# Patient Record
Sex: FEMALE | URBAN - METROPOLITAN AREA
[De-identification: names, ages, dates, MRNs, and addresses within clinical notes are randomized per-mention and may not be internally consistent; named-entity substitution may affect disease eponyms.]

---

## 2024-12-20 ENCOUNTER — TELEPHONE (OUTPATIENT)
Dept: SURGERY | Facility: CLINIC | Age: 34
End: 2024-12-20
Payer: MEDICAID

## 2024-12-20 NOTE — TELEPHONE ENCOUNTER
Patient has a NBPV + Class aidee 01/06/25 = Needs a RD appt - Called & LVM to call me back to [facility] appt

## 2024-12-26 ENCOUNTER — TELEPHONE (OUTPATIENT)
Dept: SURGERY | Facility: CLINIC | Age: 34
End: 2024-12-26
Payer: MEDICAID

## 2025-01-06 ENCOUNTER — APPOINTMENT (OUTPATIENT)
Dept: SURGERY | Facility: CLINIC | Age: 35
End: 2025-01-06
Payer: MEDICAID

## 2025-01-06 VITALS — BODY MASS INDEX: 46.61 KG/M2 | HEIGHT: 66 IN | WEIGHT: 290 LBS

## 2025-01-06 DIAGNOSIS — K21.9 GASTROESOPHAGEAL REFLUX DISEASE WITHOUT ESOPHAGITIS: ICD-10-CM

## 2025-01-06 DIAGNOSIS — Z98.84 BARIATRIC SURGERY STATUS: ICD-10-CM

## 2025-01-06 DIAGNOSIS — E66.01 MORBID OBESITY DUE TO EXCESS CALORIES (MULTI): Primary | ICD-10-CM

## 2025-01-06 DIAGNOSIS — E66.01 MORBID OBESITY (MULTI): ICD-10-CM

## 2025-01-06 PROCEDURE — 99205 OFFICE O/P NEW HI 60 MIN: CPT | Performed by: SURGERY

## 2025-01-06 PROCEDURE — 3008F BODY MASS INDEX DOCD: CPT | Performed by: SURGERY

## 2025-01-06 NOTE — PATIENT INSTRUCTIONS
PLAN:  The plan of treatment for Ronan Rosa is to continue with the consultations and tests ordered today in hopes of qualifying for pre-operative clearance for bariatric surgery. This includes:  Read labels and know what is in your food.     Limit rice,  bread and pasta to once/week  Consult Nutrition for education and MSWL  Consult Psychology  Consult Physical Therapy for limited mobility  Consult cardiology  Consult pulmonology  Labs/CXR/EKG ordered  EGD  PCP for medical optimization  Consult sleep medicine - concern for BRAD    The following are some lifestyle changes you should begin to prepare you for your sleeve surgery.   Eliminate soda and other carbonated beverages from your diet. Carbonation will not be well tolerated after surgery. Try Propel, Vitamin Water Zero, Sobe Lifewater, Crystal Light or water.    Increase fluid consumption to 64 oz daily. Do not drink within 30 minutes of eating as this will liquefy your food and make you hungry more quickly.    Exercise for 30-60 minutes daily. Brisk walking, bike riding and swimming are all examples of healthy exercise. If you are unable to exercise we recommend seated exercise.    Do not skip meals.    Take a multivitamin daily.    Lose weight. In preparation for your surgery it is important that you begin making healthier food choices now. Our dietitian will meet with you to help you select foods lower in calories and higher in nutrition. We would like you to lose at least 10  lbs prior to surgery.     Increase your protein intake to 60 grams per day.    Alcohol is empty calories. Please eliminate while preparing for surgery.    Plan your meals.      General Instruction: 1) Use the information we gave you today to work through your insurance requirements and medical clearances.   2) These documents need to get faxed to the program navigators so they can submit them for approval from your insurance company.   3) Obtain labs today at a  facility. We will  call you with any abnormalities and corrections you need to make.   4) Continue to work with your primary care doctor and other specialist so your other health problems are well controlled prior to your surgery.   5) Adopt the recommendations of the program dietician so you develop healthy eating patterns.   6) Work with the sleep team to get your sleep apnea treated to prevent other health problems .   7) Consider attending a support group to learn from other who have been through the process.   8) Come to the MSWL sessions.

## 2025-01-06 NOTE — PROGRESS NOTES
Subjective   Date: 1/6/2025 Time: 12:13 PM  Name: Ronan Rosa  MRN: [id number]  This is a 34 y.o. female with morbid obesity (Body mass index is 46.81 kg/m².) who presents to clinic for consideration of bariatric surgery. she has attempted and failed multiple diet and exercise regimens for weight loss. Initial Onset of obesity was at 18 years old.  Their goal for surgery is to  be healthier  and lose weight. The patient has tried multiple diets to lose weight including  intermittent fasting . The patient was most successful with the  intermittent fasting . The most pounds lost on this diet were 15 lbs. The patient considers their dietary weakness to be carbs, fast food, portion size and sweets. The patient reports a  highest weight ever of 292 pounds and lowest weight ever of 230 pounds Distribution of Obesity: is general. Current diet: none Compliance: Poor Adherence Diet Problems: The patient exercises 3 times /week  60 Minutes/Day Types of Exercise : strength training and walking  She feels nothing else  is working. Feels portion size is an issue and exercising and eating right consistently.   Comorbidities: anxiety, back paintakes NSAIDS, depressed moodtakes medications, esophageal reflux, joint paintakes NSAIDs, and knee paintakes NSAIDs    Uses ibuprofen every othermonth.  Reflux is new and trying to see what iscausing it    Procedure preferred - Band, Balloon,Sleeve possible meds      NO PPI    How bad is the heartburn? 3 = Symptoms bothersome every day  Heartburn when lying down? 3 = Symptoms bothersome every day  Heartburn when standing up? 3 = Symptoms bothersome every day  Heartburn after meals? 3 = Symptoms bothersome every day  Does heartburn change your diet? 2 = Symptoms noticeable and bothersome but not every day  Does heartburn wake you from sleep? 0 = No symptoms  Do you have difficulty swallowing? 0 = No symptoms  Do you have pain with swallowing? 0 = No symptoms  If you take medication, does  this affect your daily life? 0 = No symptoms  How bad is the regurgitation? 3 = Symptoms bothersome every day  Regurgitation when lying down? 0 = No symptoms  Regurgitation when standing up? 3 = Symptoms bothersome every day  Regurgitation after meals? 3 = Symptoms bothersome every day  Does regurgitation change your diet? 1 = Symptoms noticeable but not bothersome  Does regurgitation wake you from sleep? 0 = No symptoms  How satisfied are you with your present condition? Dissatisfied    PMH: No past medical history on file.     PSH:   Past Surgical History:   Procedure Laterality Date     SECTION, LOW TRANSVERSE            FAMILY HISTORY:  Family History   Problem Relation Name Age of Onset    Hypertension Mother      Diabetes Father          SOCIAL HISTORY:  Social History     Tobacco Use    Smoking status: Former     Current packs/day: 1.00     Types: Cigarettes     Did1 cigarette/ day for a week in August but none recent. No vaping or hookahs  MEDICATIONS:  Prior to Admission Medications:  Medication Documentation Review Audit       Reviewed by Saleem Henderson MD (Physician) on 25 at 1121      Medication Order Taking? Sig Documenting Provider Last Dose Status            No Medications to Display                                    ALLERGIES:  No Known Allergies    REVIEW OF SYSTEMS:  GENERAL: Negative for malaise, significant weight loss and fever  HEAD: Negative for headache, swelling.  NECK: Negative for lumps, goiter, pain and significant neck swelling  RESPIRATORY: Negative for cough, wheezing or shortness of breath.  CARDIOVASCULAR: Negative for chest pain, leg swelling or palpitations.  GI: Negative for abdominal discomfort, blood in stools or black stools or change in bowel habits  : No history of dysuria, frequency or incontinence  MUSCULOSKELETAL: Negative for joint pain or swelling, back pain or muscle pain.  SKIN: Negative for lesions, rash, and itching.  PSYCH: Negative for sleep  "disturbance, mood disorder and recent psychosocial stressors.  ENDOCRINE: Negative for cold or heat intolerance, polyuria, polydipsia and goiter.    Objective   PHYSICAL EXAM:  Visit Vitals  Ht 1.676 m (5' 6\")   Wt 132 kg (290 lb)   BMI 46.81 kg/m²   Smoking Status Former   BSA 2.48 m²     General appearance: obese, NAD  Neuro: AOx3  Head: EOMI; no swelling or lesions of scalp or face  ENT:  no lumps or lymphadenopathy, thyroid normal to palpation; oropharynx clear, no swelling or erythema  Skin: warm, no erythema or rashes  Lungs: clear to percussion and auscultation  Heart: regular rhythm and S1, S2 normal  Abdomen: soft, non-tender, no masses, no organomegaly  Extremities: Normal exam of the extremities. No swelling or pain.  Psych: no hurried speech, no flight of ideas, normal affect    IMPRESSION:  Ronan Rosa is a 34 y.o. female with a bmi of Body mass index is 46.81 kg/m². with the following diagnoses and co-morbidities: knee pain and family histroy of htn and dm.  Notes weight just keeps going up.  She is great candidate for weight loss surgery.   She is low risk for complication.     We discussed the sleeve  at [facility] and all questions were answered. risks and benefits were discussed  The patient understands the risks and benefits of the procedure and how the procedure is performed. The patient understands the risks include but are not limited to bleeding, infection, DVT, PE, pneumonia, myocardial infarction, leak along the staple lines, and weight regain. We discussed lifestyle changes necessary to be successful.       This patient does meet the criteria for a surgical weight loss procedure according to NIH guidelines.  The risks of sleeve gastrectomy, Abdi-en-Y gastric bypass, and duodenal switch surgery including bleeding, leak, wound infection, dehydration, ulcers, internal hernia, DVT/PE, prolonged nausea/vomiting, incomplete resolution of associated medical conditions, reflux, weight regain, " vitamin/mineral deficiencies, and death have been explained to the patient and Ronan Rosa has expressed understanding and acceptance of them.     The increased risk of substance and alcohol abuse following bariatric surgery was discussed with the patient, along with the negative consequences of substance/alcohol use after surgery including addiction, worsening of mental health disorders, and injury to the stomach. The risk of smoking and vaping (tobacco or any other substance) after bariatric surgery was explained to the patient. This includes risk of anastamotic ulcers, gastritis, bleeding, perforation, stricture, and PO intolerance.  The patient expressed understanding and acceptance of these risks.    The benefits of the above surgeries including weight loss, improvement/resolution of associated medical and mental health conditions, improved mobility, and decreased mortality have been explained the the patient and Ronan Rosa has expressed understanding and acceptance of them.    Assessment/Plan   PLAN:  The plan of treatment for Ronan Rosa is to continue with the consultations and tests ordered today in hopes of qualifying for pre-operative clearance for bariatric surgery. This includes:  Read labels and know what is in your food.     Limit rice,  bread and pasta to once/week  Consult Nutrition for education and MSWL  Consult Psychology  Consult Physical Therapy for limited mobility  Consult cardiology  Consult pulmonology  Labs/CXR/EKG ordered  EGD  PCP for medical optimization  Consult sleep medicine - concern for BRAD    The following are some lifestyle changes you should begin to prepare you for your sleeve surgery.   Eliminate soda and other carbonated beverages from your diet. Carbonation will not be well tolerated after surgery. Try Propel, Vitamin Water Zero, Sobe Lifewater, Crystal Light or water.    Increase fluid consumption to 64 oz daily. Do not drink within 30 minutes of eating as this  will liquefy your food and make you hungry more quickly.    Exercise for 30-60 minutes daily. Brisk walking, bike riding and swimming are all examples of healthy exercise. If you are unable to exercise we recommend seated exercise.    Do not skip meals.    Take a multivitamin daily.    Lose weight. In preparation for your surgery it is important that you begin making healthier food choices now. Our dietitian will meet with you to help you select foods lower in calories and higher in nutrition. We would like you to lose at least 10  lbs prior to surgery.     Increase your protein intake to 60 grams per day.    Alcohol is empty calories. Please eliminate while preparing for surgery.    Plan your meals.      General Instruction: 1) Use the information we gave you today to work through your insurance requirements and medical clearances.   2) These documents need to get faxed to the program navigators so they can submit them for approval from your insurance company.   3) Obtain labs today at a  facility. We will call you with any abnormalities and corrections you need to make.   4) Continue to work with your primary care doctor and other specialist so your other health problems are well controlled prior to your surgery.   5) Adopt the recommendations of the program dietician so you develop healthy eating patterns.   6) Work with the sleep team to get your sleep apnea treated to prevent other health problems .   7) Consider attending a support group to learn from other who have been through the process.   8) Come to the MSWL sessions.   45 minutes were spent with patient including history, physical exam, and education.

## 2025-01-07 ENCOUNTER — APPOINTMENT (OUTPATIENT)
Dept: SURGERY | Facility: CLINIC | Age: 35
End: 2025-01-07
Payer: MEDICAID

## 2025-01-07 VITALS — HEIGHT: 66 IN | WEIGHT: 290 LBS | BODY MASS INDEX: 46.61 KG/M2

## 2025-01-07 NOTE — PROGRESS NOTES
"Initial Bariatric Nutrition Assessment    Surgeon: Marta  Patient is considering: sleeve gastrectomy    ASSESSMENT:  Current weight:   Vitals:    01/07/25 1412   Weight: 132 kg (290 lb)    Ht:  1.676 m (5' 6\")  BMI: Body mass index is 46.81 kg/m².        Initial start weight in pounds: 290.0    Pre-Op Excess Body Weight (EBW) in pounds:  135.0  Target Post-Op weight goal in pounds: 202.2 - 222.5       This is a 34 y.o. female with morbid obesity (Body mass index is 46.81 kg/m².) who presents to clinic for consideration of bariatric surgery. The patient has attempted and failed multiple diet and exercise regimens for weight loss.   Goal for surgery is to lower weight for overall health.      Food allergies/intolerances: no   Chewing/Swallowing/Dentition: no  Nausea / Vomiting / Hx Gastroparesis: possible acid reflux  Diarrhea/ Constipation: no  Smoking/Tobacco use: no  Vitamins/Minerals supplements: MVI  Hours of sleep/night: 6-7  Exercise: no  Previous Weight loss Attempts: Intermittent fasting, Keto, Melania's, otc. Diet pills, RX meds    Medications:   No current outpatient medications on file.    24 HOUR RECALL/DIET HISTORY:  Breakfast:  skipped  Snack: no  Lunch: baked chicken with skin, white rice and vegetables  Snack: alba. chip cookies  Dinner: baked pork chop and greens  Snack: no  Beverages: water, pop, tea with honey, juice, lemonade, Body Monona  Alcohol: occasionally    Person responsible for cooking & shopping? self  How often do you eat sweet snacks? 5x/wk  How often do you eat savory snacks?  2x/wk  How often do you eat out? 5x/wk    Do you feel overly stuffed? no  Binge Eating? no   Night Eating? sometimes  Emotional Eating? some stress and boredom     READINESS TO LEARN:  Motivation to learn: Interested        Understanding of instruction: Good  Anticipated Compliance: Good  Family Support: friend     Educational Materials Provided:    Pre-op Diet and meal plan                                       "    Nutrition Guidelines for Gastric Bypass and Sleeve Gastrectomy   Schedules for MSWL class and support group   Goals sheet    Nutrition assessment completed today.  Pt will be scheduled for video education class to discuss the 2 week pre op diet, post op protein and fluid goals, vitamin and mineral supplementation, exercise goals, and post op diet progression closer to the time of surgery.    Instructed pt on a 1400 calorie meal plan and encouraged patient  to measure and record intake daily.  Advised to eat 3 meals and 1-2 high protein or produce based snacks daily.   Reviewed postop behaviors to start practicing.   Set a goal to start doing regular exercise.    Patient was receptive to nutritional recommendations, asked numerous questions, and verbalized understanding of the weight loss surgery diet.  Patient expressed understanding about the importance of strict dietary compliance post-surgery to avoid nutritional deficiencies and achieve optimal weight loss and verbalized intent to follow dietary recommendations.    Malnutrition Screening: n/a  Significant unintentional weight loss? n/a   Eating less than 75% of usual intake for more than 2 weeks? n/a      Nutrition Diagnosis:   Overweight/obesity related to excess energy intake as evidenced by BMI >= 40 kg/m^2.  Food- and nutrition-related knowledge deficit related to lack of prior exposure to surgical weight loss information as evidenced by pt new to surgical program.    Nutrition Interventions:   Modify type and amount of food and nutrients within meals and snacks.  Comprehensive Nutrition Education    Recommendations:  1. Begin following your meal plan.  Measure and record intake daily.   2. Structure meal patterns, eating three meals and 1-2 snacks per day.  3. Aim for 15-30 grams protein per meal.  Have 1-2 high protein snacks that are 10-20 g protein each.  You can try a tuna or chicken packet, Greek yogurt, 2 string cheeses, Protein bars like Quest,  Pure Protein, Premier, or Built Bars. you can also try protein chips form Quest or Atkins.    4. Drink 64oz of calorie-free, caffeine-free, and non-carbonated beverages.   5. Practice no drinking 30 minutes before meals, nothing with meals and wait 30 minutes after meals to drink again. Make meals last 30 minutes-chew thoroughly.   6. Limit or omit eating out/sweets/savory snacks to 1-2 times per week.  7. Begin daily multivitamin that is not a gummy multivitamin.  8. Increase physical activity by 10-15 minutes as tolerated to an end goal of 60 minutes 5 x per week. Consistency is the key.  Pre-op Goal weight: lose 5% of body weight    Nutrition Monitoring and Evaluation: 1-2 pound weight loss per week  Criteria: weight check  Need for Follow-up: in 1 month    Patient does meet [employer] guidelines for weight loss surgery, however needs to demonstrate consistent effort in making dietary changes before giving clearance. It is anticipated that the patient will need at least 1 nutritional follow-up visits prior to clearance for surgery.

## 2025-01-13 DIAGNOSIS — Z01.818 PRE-OPERATIVE CLEARANCE: ICD-10-CM

## 2025-01-13 DIAGNOSIS — K21.9 GASTROESOPHAGEAL REFLUX DISEASE WITHOUT ESOPHAGITIS: ICD-10-CM

## 2025-01-13 DIAGNOSIS — Z98.84 BARIATRIC SURGERY STATUS: ICD-10-CM

## 2025-01-14 ENCOUNTER — HOSPITAL ENCOUNTER (OUTPATIENT)
Dept: RADIOLOGY | Facility: CLINIC | Age: 35
Discharge: HOME | End: 2025-01-14
Payer: MEDICAID

## 2025-01-14 ENCOUNTER — LAB (OUTPATIENT)
Dept: LAB | Facility: LAB | Age: 35
End: 2025-01-14
Payer: MEDICAID

## 2025-01-14 DIAGNOSIS — K21.9 GASTROESOPHAGEAL REFLUX DISEASE WITHOUT ESOPHAGITIS: ICD-10-CM

## 2025-01-14 DIAGNOSIS — Z98.84 BARIATRIC SURGERY STATUS: ICD-10-CM

## 2025-01-14 DIAGNOSIS — Z01.818 PRE-OPERATIVE CLEARANCE: ICD-10-CM

## 2025-01-14 LAB
25(OH)D3 SERPL-MCNC: 27 NG/ML (ref 30–100)
ALBUMIN SERPL BCP-MCNC: 4 G/DL (ref 3.4–5)
ALP SERPL-CCNC: 54 U/L (ref 33–110)
ALT SERPL W P-5'-P-CCNC: 18 U/L (ref 7–45)
AMPHETAMINES UR QL SCN: NORMAL
ANION GAP SERPL CALC-SCNC: 12 MMOL/L (ref 10–20)
APTT PPP: 29 SECONDS (ref 27–38)
AST SERPL W P-5'-P-CCNC: 14 U/L (ref 9–39)
BARBITURATES UR QL SCN: NORMAL
BASOPHILS # BLD AUTO: 0.03 X10*3/UL (ref 0–0.1)
BASOPHILS NFR BLD AUTO: 0.5 %
BENZODIAZ UR QL SCN: NORMAL
BILIRUB SERPL-MCNC: 0.4 MG/DL (ref 0–1.2)
BUN SERPL-MCNC: 13 MG/DL (ref 6–23)
BZE UR QL SCN: NORMAL
CALCIUM SERPL-MCNC: 8.9 MG/DL (ref 8.6–10.6)
CANNABINOIDS UR QL SCN: NORMAL
CHLORIDE SERPL-SCNC: 106 MMOL/L (ref 98–107)
CHOLEST SERPL-MCNC: 98 MG/DL (ref 0–199)
CHOLESTEROL/HDL RATIO: 2.6
CO2 SERPL-SCNC: 24 MMOL/L (ref 21–32)
CREAT SERPL-MCNC: 0.72 MG/DL (ref 0.5–1.05)
EGFRCR SERPLBLD CKD-EPI 2021: >90 ML/MIN/1.73M*2
EOSINOPHIL # BLD AUTO: 0.08 X10*3/UL (ref 0–0.7)
EOSINOPHIL NFR BLD AUTO: 1.3 %
ERYTHROCYTE [DISTWIDTH] IN BLOOD BY AUTOMATED COUNT: 16.3 % (ref 11.5–14.5)
EST. AVERAGE GLUCOSE BLD GHB EST-MCNC: 105 MG/DL
FENTANYL+NORFENTANYL UR QL SCN: NORMAL
FERRITIN SERPL-MCNC: 23 NG/ML (ref 8–150)
FOLATE SERPL-MCNC: 20.3 NG/ML
GLUCOSE SERPL-MCNC: 85 MG/DL (ref 74–99)
HBA1C MFR BLD: 5.3 %
HCT VFR BLD AUTO: 34.2 % (ref 36–46)
HDLC SERPL-MCNC: 38 MG/DL
HGB BLD-MCNC: 10.7 G/DL (ref 12–16)
IMM GRANULOCYTES # BLD AUTO: 0.01 X10*3/UL (ref 0–0.7)
IMM GRANULOCYTES NFR BLD AUTO: 0.2 % (ref 0–0.9)
INR PPP: 1.2 (ref 0.9–1.1)
IRON SATN MFR SERPL: 14 % (ref 25–45)
IRON SERPL-MCNC: 60 UG/DL (ref 35–150)
LDLC SERPL CALC-MCNC: 51 MG/DL
LYMPHOCYTES # BLD AUTO: 2.44 X10*3/UL (ref 1.2–4.8)
LYMPHOCYTES NFR BLD AUTO: 41 %
MCH RBC QN AUTO: 23.7 PG (ref 26–34)
MCHC RBC AUTO-ENTMCNC: 31.3 G/DL (ref 32–36)
MCV RBC AUTO: 76 FL (ref 80–100)
METHADONE UR QL SCN: NORMAL
MONOCYTES # BLD AUTO: 0.47 X10*3/UL (ref 0.1–1)
MONOCYTES NFR BLD AUTO: 7.9 %
NEUTROPHILS # BLD AUTO: 2.92 X10*3/UL (ref 1.2–7.7)
NEUTROPHILS NFR BLD AUTO: 49.1 %
NON HDL CHOLESTEROL: 60 MG/DL (ref 0–149)
NRBC BLD-RTO: 0 /100 WBCS (ref 0–0)
OPIATES UR QL SCN: NORMAL
OXYCODONE+OXYMORPHONE UR QL SCN: NORMAL
PCP UR QL SCN: NORMAL
PLATELET # BLD AUTO: 330 X10*3/UL (ref 150–450)
POTASSIUM SERPL-SCNC: 4.2 MMOL/L (ref 3.5–5.3)
PROT SERPL-MCNC: 7.3 G/DL (ref 6.4–8.2)
PROTHROMBIN TIME: 13 SECONDS (ref 9.8–12.8)
PTH-INTACT SERPL-MCNC: 47.5 PG/ML (ref 18.5–88)
RBC # BLD AUTO: 4.52 X10*6/UL (ref 4–5.2)
SODIUM SERPL-SCNC: 138 MMOL/L (ref 136–145)
T4 FREE SERPL-MCNC: 1.21 NG/DL (ref 0.78–1.48)
TIBC SERPL-MCNC: 418 UG/DL (ref 240–445)
TRIGL SERPL-MCNC: 43 MG/DL (ref 0–149)
TSH SERPL-ACNC: 0.91 MIU/L (ref 0.44–3.98)
UIBC SERPL-MCNC: 358 UG/DL (ref 110–370)
VIT B12 SERPL-MCNC: 468 PG/ML (ref 211–911)
VLDL: 9 MG/DL (ref 0–40)
WBC # BLD AUTO: 6 X10*3/UL (ref 4.4–11.3)

## 2025-01-14 PROCEDURE — 71046 X-RAY EXAM CHEST 2 VIEWS: CPT

## 2025-01-14 PROCEDURE — 85610 PROTHROMBIN TIME: CPT

## 2025-01-14 PROCEDURE — 82746 ASSAY OF FOLIC ACID SERUM: CPT

## 2025-01-14 PROCEDURE — 84439 ASSAY OF FREE THYROXINE: CPT

## 2025-01-14 PROCEDURE — 80323 ALKALOIDS NOS: CPT

## 2025-01-14 PROCEDURE — 80307 DRUG TEST PRSMV CHEM ANLYZR: CPT

## 2025-01-14 PROCEDURE — 82728 ASSAY OF FERRITIN: CPT

## 2025-01-14 PROCEDURE — 82306 VITAMIN D 25 HYDROXY: CPT

## 2025-01-14 PROCEDURE — 84425 ASSAY OF VITAMIN B-1: CPT

## 2025-01-14 PROCEDURE — 82607 VITAMIN B-12: CPT

## 2025-01-14 PROCEDURE — 82525 ASSAY OF COPPER: CPT

## 2025-01-14 PROCEDURE — 80053 COMPREHEN METABOLIC PANEL: CPT

## 2025-01-14 PROCEDURE — 36415 COLL VENOUS BLD VENIPUNCTURE: CPT

## 2025-01-14 PROCEDURE — 84630 ASSAY OF ZINC: CPT

## 2025-01-14 PROCEDURE — 85730 THROMBOPLASTIN TIME PARTIAL: CPT

## 2025-01-14 PROCEDURE — 80061 LIPID PANEL: CPT

## 2025-01-14 PROCEDURE — 83970 ASSAY OF PARATHORMONE: CPT

## 2025-01-14 PROCEDURE — 84443 ASSAY THYROID STIM HORMONE: CPT

## 2025-01-14 PROCEDURE — 83540 ASSAY OF IRON: CPT

## 2025-01-14 PROCEDURE — 83036 HEMOGLOBIN GLYCOSYLATED A1C: CPT

## 2025-01-14 PROCEDURE — 83550 IRON BINDING TEST: CPT

## 2025-01-14 PROCEDURE — 85025 COMPLETE CBC W/AUTO DIFF WBC: CPT

## 2025-01-15 ENCOUNTER — ANCILLARY PROCEDURE (OUTPATIENT)
Dept: CARDIOLOGY | Facility: CLINIC | Age: 35
End: 2025-01-15
Payer: MEDICAID

## 2025-01-15 ENCOUNTER — TELEPHONE (OUTPATIENT)
Dept: OBSTETRICS AND GYNECOLOGY | Facility: CLINIC | Age: 35
End: 2025-01-15

## 2025-01-15 DIAGNOSIS — K21.9 GASTROESOPHAGEAL REFLUX DISEASE WITHOUT ESOPHAGITIS: ICD-10-CM

## 2025-01-15 DIAGNOSIS — Z01.818 PRE-OPERATIVE CLEARANCE: ICD-10-CM

## 2025-01-15 DIAGNOSIS — Z98.84 BARIATRIC SURGERY STATUS: ICD-10-CM

## 2025-01-15 LAB
ATRIAL RATE: 64 BPM
P AXIS: 36 DEGREES
P OFFSET: 181 MS
P ONSET: 135 MS
PR INTERVAL: 166 MS
Q ONSET: 218 MS
QRS COUNT: 10 BEATS
QRS DURATION: 80 MS
QT INTERVAL: 384 MS
QTC CALCULATION(BAZETT): 396 MS
QTC FREDERICIA: 392 MS
R AXIS: 71 DEGREES
T AXIS: 38 DEGREES
T OFFSET: 410 MS
VENTRICULAR RATE: 64 BPM

## 2025-01-15 PROCEDURE — 93010 ELECTROCARDIOGRAM REPORT: CPT | Performed by: INTERNAL MEDICINE

## 2025-01-15 PROCEDURE — 93005 ELECTROCARDIOGRAM TRACING: CPT

## 2025-01-16 LAB
COPPER SERPL-MCNC: 151.5 UG/DL (ref 80–155)
COTININE SERPL-MCNC: <5 NG/ML
NICOTINE SERPL-MCNC: <5 NG/ML
ZINC SERPL-MCNC: 77.8 UG/DL (ref 60–120)

## 2025-01-18 LAB — VIT B1 PYROPHOSHATE BLD-SCNC: 81 NMOL/L (ref 70–180)

## 2025-01-22 ENCOUNTER — APPOINTMENT (OUTPATIENT)
Dept: CARDIOLOGY | Facility: CLINIC | Age: 35
End: 2025-01-22
Payer: MEDICAID

## 2025-01-27 ENCOUNTER — APPOINTMENT (OUTPATIENT)
Dept: SLEEP MEDICINE | Facility: CLINIC | Age: 35
End: 2025-01-27
Payer: MEDICAID

## 2025-02-05 ENCOUNTER — APPOINTMENT (OUTPATIENT)
Dept: SURGERY | Facility: CLINIC | Age: 35
End: 2025-02-05
Payer: MEDICAID

## 2025-02-05 VITALS — WEIGHT: 284 LBS | BODY MASS INDEX: 45.64 KG/M2 | HEIGHT: 66 IN

## 2025-02-05 NOTE — PROGRESS NOTES
"PREOPERATIVE, MULTIDISCIPLINARY, MEDICALLY SUPERVISED, REDUCED CALORIE DIET, BEHAVIOR MODIFICATION AND EXERCISE PROGRAM    S:  The patient is working on modifying her diet.  24 hour food recall:  B:  Premier protein shake L: 2 chicken tacos with cheese, lettuce, salsa and Italian dressing; D: grilled chicken salad with croutons., cheese and Caesar dressing; s; cinnamon pretzel nuggets.    O:    Vitals:    02/05/25 0910   Weight: 129 kg (284 lb)    Ht:   1.676 m (5' 6\")     BMI: Body mass index is 45.84 kg/m².    Goal: 5% body weight loss over the course of program    Dietary recommendation:   1. Eliminate high calorie, carbonated, and caffeinated beverages  Try Propel, Minute Maid Zero or diet cran juice with 5 calories.  Try sf lemonade such as Crystal Light.  2. Practice the 30-30-30 rule by drinking between meals.  3. Structure your meal plan - have 3 meals and 1 snack daily.  Use low calorie candiments with 60 calories or less per serving.  4. Have balanced meals that always contain a good source of protein.  5. Increase intake of non-starchy vegetables.  Have 5 servings fruits and vegetables daily.   6. Take a multivitamin daily.  7. Increase physical activity by 10-15 minutes to an end goal of 60 minutes 5 x per week.    Group Topic: Portion Control   Behavioral recommendation: Pt is encouraged to identify and use the appropriate serving sizes from each food group.    A/P: Pt appears to have a good understanding of how to incorporate appropriate portion sizes into their daily meal pattern.  Pt has a goal to begin weighing and measuring portions until able to visualize the appropriate portion size.    Exercise: Walking 3x/wk for 30 min.    Scheduled for NBP zoom next week and will follow-up next month.    Shahnaz Fregoso, RD, LD  "

## 2025-02-24 ENCOUNTER — APPOINTMENT (OUTPATIENT)
Facility: CLINIC | Age: 35
End: 2025-02-24
Payer: MEDICAID

## 2025-02-24 VITALS
HEIGHT: 66 IN | RESPIRATION RATE: 18 BRPM | TEMPERATURE: 98.1 F | BODY MASS INDEX: 46.51 KG/M2 | DIASTOLIC BLOOD PRESSURE: 77 MMHG | SYSTOLIC BLOOD PRESSURE: 109 MMHG | HEART RATE: 74 BPM | OXYGEN SATURATION: 95 % | WEIGHT: 289.4 LBS

## 2025-02-24 DIAGNOSIS — K21.9 GASTROESOPHAGEAL REFLUX DISEASE WITHOUT ESOPHAGITIS: ICD-10-CM

## 2025-02-24 DIAGNOSIS — G47.30 SLEEP DISORDER BREATHING: Primary | ICD-10-CM

## 2025-02-24 DIAGNOSIS — Z01.818 PRE-OPERATIVE CLEARANCE: ICD-10-CM

## 2025-02-24 DIAGNOSIS — Z98.84 BARIATRIC SURGERY STATUS: ICD-10-CM

## 2025-02-24 PROCEDURE — 3008F BODY MASS INDEX DOCD: CPT | Performed by: GENERAL PRACTICE

## 2025-02-24 PROCEDURE — 99244 OFF/OP CNSLTJ NEW/EST MOD 40: CPT | Performed by: GENERAL PRACTICE

## 2025-02-24 PROCEDURE — 1036F TOBACCO NON-USER: CPT | Performed by: GENERAL PRACTICE

## 2025-02-24 ASSESSMENT — PATIENT HEALTH QUESTIONNAIRE - PHQ9
1. LITTLE INTEREST OR PLEASURE IN DOING THINGS: NOT AT ALL
SUM OF ALL RESPONSES TO PHQ9 QUESTIONS 1 AND 2: 0
2. FEELING DOWN, DEPRESSED OR HOPELESS: NOT AT ALL

## 2025-02-24 ASSESSMENT — COLUMBIA-SUICIDE SEVERITY RATING SCALE - C-SSRS
6. HAVE YOU EVER DONE ANYTHING, STARTED TO DO ANYTHING, OR PREPARED TO DO ANYTHING TO END YOUR LIFE?: NO
2. HAVE YOU ACTUALLY HAD ANY THOUGHTS OF KILLING YOURSELF?: NO
1. IN THE PAST MONTH, HAVE YOU WISHED YOU WERE DEAD OR WISHED YOU COULD GO TO SLEEP AND NOT WAKE UP?: NO

## 2025-02-24 ASSESSMENT — SLEEP AND FATIGUE QUESTIONNAIRES: HOW LIKELY ARE YOU TO NOD OFF OR FALL ASLEEP WHILE SITTING AND READING: WOULD NEVER DOZE

## 2025-02-24 NOTE — PROGRESS NOTES
Patient: Tasha Solano    [id number]  : 1990 -- AGE 34 y.o.    Provider: Carlos Schmidt DO     Location [facility]   Service Date: 2025              [facility]  New Visit Note        HISTORY OF PRESENT ILLNESS     The patient's referring provider is: Saleem Henderson MD    HISTORY OF PRESENT ILLNESS   My Matilde Solano is a 34 y.o. female who presents to a [facility] for a sleep medicine evaluation with concerns of Referral, Snoring, and Dry Mouth.     The patient  has a past medical history of GERD (gastroesophageal reflux disease) and Kidney stone..    PAST SLEEP HISTORY    Pmhx includes GERD?, papilledema?. Obesity.     Patient states that she would like to get bariatric surgery done. She presents for an evaluation of suspected sleep apnea.       Sleep schedule  on weekdays / work days:  Usual Bedtime: 10-11pm  Sleep latency: about 1hr, emails/ social media.   Wake time : 7am  Total sleep time average/day: 6-7 hours/day  Awakenings: 2x per night, nocturia, short.   Naps: 2-3x per week, 1-3pm, 1hr, refreshing sometimes.      Sleep schedule  on weekends/non work days :  Usual Bedtime:   11pm-1am  Wake time : 8am    Sleep aids: takes mg, she feels that it gives her a deeper sleep.   Stimulants: no    Occupation: , regular hrs.     Preferred sleeping position: SLEEP POSITION: prone    Sleep-related ROS:    Snoring:  y  Witnessed apneas: n       Gasping/ choking: n     Am Dry mouth: y            Nasal congestion:  n       am headaches: n    Sleep is described as unrefreshing sometimes.     Daytime sleepiness: y  Fatigue or decreased energy: y  Difficulty remembering things in daytime: sometimes  Difficulty staying focused in daytime: n  Irritable during the day: sometimes    Drowsy driving: n  Hx of car accident: n  Near-miss Car accident: n      RLS screen:  RLSSCREEN: - Sensations:  "Patient does not have unusual sensations in their extremities that cause an urge to move them     Sleep-related behaviors: DENIES      ESS: 4     REVIEW OF SYSTEMS     REVIEW OF SYSTEMS  Review of Systems   All other systems reviewed and are negative.        ALLERGIES AND MEDICATIONS     ALLERGIES  No Known Allergies    MEDICATIONS  No current outpatient medications on file.     No current facility-administered medications for this visit.         PAST HISTORY     PAST MEDICAL HISTORY  She  has a past medical history of GERD (gastroesophageal reflux disease) and Kidney stone.      PAST SURGICAL HISTORY:  Past Surgical History:   Procedure Laterality Date     SECTION, LOW TRANSVERSE      KIDNEY STONE SURGERY         FAMILY HISTORY  Family History   Problem Relation Name Age of Onset    Hypertension Mother      Diabetes Father       DOES/DOES NOT EC: does not have a family history of sleep disorder.      SOCIAL HISTORY  She  reports that she has quit smoking. Her smoking use included cigarettes. She has never used smokeless tobacco. She reports current alcohol use. She reports that she does not use drugs.     Caffeine consumption: no  Smoking: n  Marijuana: n  Other drugs: n      PHYSICAL EXAM     VITAL SIGNS: /77   Pulse 74   Temp 36.7 °C (98.1 °F)   Resp 18   Ht 1.676 m (5' 6\")   Wt 131 kg (289 lb 6.4 oz)   LMP 2025 (Exact Date)   SpO2 95%   BMI 46.71 kg/m²      PREVIOUS WEIGHTS:  Wt Readings from Last 3 Encounters:   25 131 kg (289 lb 6.4 oz)   25 129 kg (284 lb)   25 129 kg (284 lb 1 oz)       Physical Exam  Constitutional: Alert and oriented, cooperative, no obvious distress.   HENT: normocephalic.   Eyes: PERRLA, nonicteric   Neck: Supple, trachea midline   respiratory: CTA bilaterally, no wheezing/ crackles/ cough  Cardiac: no rub/ gallops  GI:BS in all 4 quadrants, Soft, nontender, no masses  musculoskeletal/ Extremities: No clubbing  integumentary: no significant " rashes observed.   Neurologic: AOx3.   psychiatric: appropriate mood and affect.  Modified Mallampati: 3    RESULTS/DATA     Iron (ug/dL)   Date Value   01/14/2025 60     % Saturation (%)   Date Value   01/14/2025 14 (L)     TIBC (ug/dL)   Date Value   01/14/2025 418     Ferritin (ng/mL)   Date Value   01/14/2025 23               ASSESSMENT/PLAN     Ms. Solano is a 34 y.o. female and  has a past medical history of GERD (gastroesophageal reflux disease) and Kidney stone. She was referred to the [facility] for evaluation of suspected sleep apnea.     Problem List Items Addressed This Visit       Gastroesophageal reflux disease without esophagitis     Other Visit Diagnoses       Bariatric surgery status        Pre-operative clearance                Problem List and Orders  Pmhx includes GERD?, papilledema?. Obesity.     1- sleep disorder breathing  Symptoms include snoring, night time awakenings, unrefreshing sleep sometimes.   -ordered sleep study  Patient called and is requesting an hst, due to timing --> ordered and canceled in lab order.     -do not drive or operate heavy machinery if drowsy.  -avoid sedating substances/ medication, alcohol, illicit drugs and tobacco.    2- Obesity/ bariatric surgery candidate  counseled on eating a healthy diet and exercising as tolerated.    3- GERD  Per patient she no longer has symptoms.     Follow up after sleep study or sooner as needed.

## 2025-02-26 ENCOUNTER — APPOINTMENT (OUTPATIENT)
Dept: BEHAVIORAL HEALTH | Facility: CLINIC | Age: 35
End: 2025-02-26
Payer: MEDICAID

## 2025-03-17 ENCOUNTER — APPOINTMENT (OUTPATIENT)
Dept: BEHAVIORAL HEALTH | Facility: CLINIC | Age: 35
End: 2025-03-17
Payer: MEDICAID

## 2025-03-31 ENCOUNTER — APPOINTMENT (OUTPATIENT)
Dept: BEHAVIORAL HEALTH | Facility: CLINIC | Age: 35
End: 2025-03-31
Payer: MEDICAID